# Patient Record
Sex: MALE | Race: BLACK OR AFRICAN AMERICAN | Employment: UNEMPLOYED | URBAN - METROPOLITAN AREA
[De-identification: names, ages, dates, MRNs, and addresses within clinical notes are randomized per-mention and may not be internally consistent; named-entity substitution may affect disease eponyms.]

---

## 2018-02-14 ENCOUNTER — HOSPITAL ENCOUNTER (EMERGENCY)
Age: 28
Discharge: PSYCHIATRIC OR SUBSTANCE USE FACILITY | End: 2018-02-14
Attending: EMERGENCY MEDICINE | Admitting: EMERGENCY MEDICINE
Payer: SELF-PAY

## 2018-02-14 ENCOUNTER — HOSPITAL ENCOUNTER (INPATIENT)
Age: 28
LOS: 2 days | Discharge: HOME | DRG: 885 | End: 2018-02-16
Attending: PSYCHIATRY & NEUROLOGY | Admitting: PSYCHIATRY & NEUROLOGY
Payer: COMMERCIAL

## 2018-02-14 VITALS
TEMPERATURE: 98.9 F | BODY MASS INDEX: 22.9 KG/M2 | HEIGHT: 70 IN | HEART RATE: 89 BPM | WEIGHT: 160 LBS | SYSTOLIC BLOOD PRESSURE: 109 MMHG | OXYGEN SATURATION: 100 % | DIASTOLIC BLOOD PRESSURE: 78 MMHG | RESPIRATION RATE: 18 BRPM

## 2018-02-14 DIAGNOSIS — R45.851 SUICIDAL IDEATION: ICD-10-CM

## 2018-02-14 DIAGNOSIS — F12.10 MARIJUANA ABUSE: ICD-10-CM

## 2018-02-14 DIAGNOSIS — F32.A DEPRESSION, UNSPECIFIED DEPRESSION TYPE: Primary | ICD-10-CM

## 2018-02-14 LAB
ALBUMIN SERPL-MCNC: 4.6 G/DL (ref 3.5–5)
ALBUMIN/GLOB SERPL: 1.4 {RATIO} (ref 1.1–2.2)
ALP SERPL-CCNC: 83 U/L (ref 45–117)
ALT SERPL-CCNC: 39 U/L (ref 12–78)
AMPHET UR QL SCN: NEGATIVE
ANION GAP SERPL CALC-SCNC: 13 MMOL/L (ref 5–15)
APPEARANCE UR: CLEAR
AST SERPL-CCNC: 28 U/L (ref 15–37)
BACTERIA URNS QL MICRO: NEGATIVE /HPF
BARBITURATES UR QL SCN: NEGATIVE
BASOPHILS # BLD: 0 K/UL (ref 0–0.1)
BASOPHILS NFR BLD: 0 % (ref 0–1)
BENZODIAZ UR QL: NEGATIVE
BILIRUB SERPL-MCNC: 0.6 MG/DL (ref 0.2–1)
BILIRUB UR QL: NEGATIVE
BUN SERPL-MCNC: 10 MG/DL (ref 6–20)
BUN/CREAT SERPL: 9 (ref 12–20)
CALCIUM SERPL-MCNC: 9.1 MG/DL (ref 8.5–10.1)
CANNABINOIDS UR QL SCN: POSITIVE
CHLORIDE SERPL-SCNC: 106 MMOL/L (ref 97–108)
CO2 SERPL-SCNC: 26 MMOL/L (ref 21–32)
COCAINE UR QL SCN: NEGATIVE
COLOR UR: ABNORMAL
CREAT SERPL-MCNC: 1.1 MG/DL (ref 0.7–1.3)
DIFFERENTIAL METHOD BLD: ABNORMAL
DRUG SCRN COMMENT,DRGCM: ABNORMAL
EOSINOPHIL # BLD: 0.1 K/UL (ref 0–0.4)
EOSINOPHIL NFR BLD: 1 % (ref 0–7)
EPITH CASTS URNS QL MICRO: ABNORMAL /LPF
ERYTHROCYTE [DISTWIDTH] IN BLOOD BY AUTOMATED COUNT: 11.9 % (ref 11.5–14.5)
ETHANOL SERPL-MCNC: 140 MG/DL
GLOBULIN SER CALC-MCNC: 3.3 G/DL (ref 2–4)
GLUCOSE SERPL-MCNC: 77 MG/DL (ref 65–100)
GLUCOSE UR STRIP.AUTO-MCNC: NEGATIVE MG/DL
HCT VFR BLD AUTO: 43.5 % (ref 36.6–50.3)
HGB BLD-MCNC: 15.2 G/DL (ref 12.1–17)
HGB UR QL STRIP: NEGATIVE
IMM GRANULOCYTES # BLD: 0 K/UL (ref 0–0.04)
IMM GRANULOCYTES NFR BLD AUTO: 0 % (ref 0–0.5)
KETONES UR QL STRIP.AUTO: NEGATIVE MG/DL
LEUKOCYTE ESTERASE UR QL STRIP.AUTO: NEGATIVE
LYMPHOCYTES # BLD: 3.1 K/UL (ref 0.8–3.5)
LYMPHOCYTES NFR BLD: 27 % (ref 12–49)
MCH RBC QN AUTO: 32.3 PG (ref 26–34)
MCHC RBC AUTO-ENTMCNC: 34.9 G/DL (ref 30–36.5)
MCV RBC AUTO: 92.4 FL (ref 80–99)
METHADONE UR QL: NEGATIVE
MONOCYTES # BLD: 0.8 K/UL (ref 0–1)
MONOCYTES NFR BLD: 7 % (ref 5–13)
MUCOUS THREADS URNS QL MICRO: ABNORMAL /LPF
NEUTS SEG # BLD: 7.4 K/UL (ref 1.8–8)
NEUTS SEG NFR BLD: 65 % (ref 32–75)
NITRITE UR QL STRIP.AUTO: NEGATIVE
NRBC # BLD: 0 K/UL (ref 0–0.01)
NRBC BLD-RTO: 0 PER 100 WBC
OPIATES UR QL: NEGATIVE
PCP UR QL: NEGATIVE
PH UR STRIP: 5.5 [PH] (ref 5–8)
PLATELET # BLD AUTO: 388 K/UL (ref 150–400)
PMV BLD AUTO: 8.9 FL (ref 8.9–12.9)
POTASSIUM SERPL-SCNC: 3.7 MMOL/L (ref 3.5–5.1)
PROT SERPL-MCNC: 7.9 G/DL (ref 6.4–8.2)
PROT UR STRIP-MCNC: NEGATIVE MG/DL
RBC # BLD AUTO: 4.71 M/UL (ref 4.1–5.7)
RBC #/AREA URNS HPF: ABNORMAL /HPF (ref 0–5)
SODIUM SERPL-SCNC: 145 MMOL/L (ref 136–145)
SP GR UR REFRACTOMETRY: 1.02 (ref 1–1.03)
UA: UC IF INDICATED,UAUC: ABNORMAL
UROBILINOGEN UR QL STRIP.AUTO: 0.2 EU/DL (ref 0.2–1)
WBC # BLD AUTO: 11.4 K/UL (ref 4.1–11.1)
WBC URNS QL MICRO: ABNORMAL /HPF (ref 0–4)

## 2018-02-14 PROCEDURE — 80053 COMPREHEN METABOLIC PANEL: CPT | Performed by: EMERGENCY MEDICINE

## 2018-02-14 PROCEDURE — 65220000003 HC RM SEMIPRIVATE PSYCH

## 2018-02-14 PROCEDURE — 36415 COLL VENOUS BLD VENIPUNCTURE: CPT | Performed by: EMERGENCY MEDICINE

## 2018-02-14 PROCEDURE — 80307 DRUG TEST PRSMV CHEM ANLYZR: CPT | Performed by: EMERGENCY MEDICINE

## 2018-02-14 PROCEDURE — 81001 URINALYSIS AUTO W/SCOPE: CPT | Performed by: EMERGENCY MEDICINE

## 2018-02-14 PROCEDURE — 99285 EMERGENCY DEPT VISIT HI MDM: CPT

## 2018-02-14 PROCEDURE — 85025 COMPLETE CBC W/AUTO DIFF WBC: CPT | Performed by: EMERGENCY MEDICINE

## 2018-02-14 RX ORDER — LORAZEPAM 2 MG/ML
2 INJECTION INTRAMUSCULAR
Status: DISCONTINUED | OUTPATIENT
Start: 2018-02-14 | End: 2018-02-16 | Stop reason: HOSPADM

## 2018-02-14 RX ORDER — OLANZAPINE 5 MG/1
5 TABLET ORAL
Status: DISCONTINUED | OUTPATIENT
Start: 2018-02-14 | End: 2018-02-16 | Stop reason: HOSPADM

## 2018-02-14 RX ORDER — ACETAMINOPHEN 325 MG/1
650 TABLET ORAL
Status: DISCONTINUED | OUTPATIENT
Start: 2018-02-14 | End: 2018-02-16 | Stop reason: HOSPADM

## 2018-02-14 RX ORDER — ZOLPIDEM TARTRATE 10 MG/1
10 TABLET ORAL
Status: DISCONTINUED | OUTPATIENT
Start: 2018-02-14 | End: 2018-02-16 | Stop reason: HOSPADM

## 2018-02-14 RX ORDER — BENZTROPINE MESYLATE 2 MG/1
2 TABLET ORAL
Status: DISCONTINUED | OUTPATIENT
Start: 2018-02-14 | End: 2018-02-16 | Stop reason: HOSPADM

## 2018-02-14 RX ORDER — ADHESIVE BANDAGE
30 BANDAGE TOPICAL DAILY PRN
Status: DISCONTINUED | OUTPATIENT
Start: 2018-02-14 | End: 2018-02-16 | Stop reason: HOSPADM

## 2018-02-14 RX ORDER — BENZTROPINE MESYLATE 1 MG/ML
2 INJECTION INTRAMUSCULAR; INTRAVENOUS
Status: DISCONTINUED | OUTPATIENT
Start: 2018-02-14 | End: 2018-02-16 | Stop reason: HOSPADM

## 2018-02-14 RX ORDER — IBUPROFEN 200 MG
1 TABLET ORAL
Status: DISCONTINUED | OUTPATIENT
Start: 2018-02-14 | End: 2018-02-16 | Stop reason: HOSPADM

## 2018-02-14 RX ORDER — LORAZEPAM 1 MG/1
1 TABLET ORAL
Status: DISCONTINUED | OUTPATIENT
Start: 2018-02-14 | End: 2018-02-16 | Stop reason: HOSPADM

## 2018-02-14 RX ORDER — IBUPROFEN 400 MG/1
400 TABLET ORAL
Status: DISCONTINUED | OUTPATIENT
Start: 2018-02-14 | End: 2018-02-16 | Stop reason: HOSPADM

## 2018-02-14 NOTE — ED NOTES
[id number] Lucie Mead for pt to eat/drink per dr nabor md. Diet tray ordered and given to pt. Pt given water/crackers. No si/s of acute distress. [employer] at bedside.

## 2018-02-14 NOTE — IP AVS SNAPSHOT
Summary of Care Report The Summary of Care report has been created to help improve care coordination. Users with access to [employer] or [address] (Web-based application) may access additional patient information including the Discharge Summary. If you are not currently a [address] user and need more information, please call the number listed below in the [place] [address] section and ask to be connected with Medical Records. Facility Information Name Address Phone [address] [id number] [phone] Patient Information Patient Name Sex ABDOULAYE Gordon ([id number]) Male 1990 Discharge Information Admitting Provider Service Area Unit Celestine West MD / [id number] [facility] / [phone] Discharge Provider Discharge Date/Time Discharge Disposition Destination (none) 2018 Afternoon (Pending) AHR (none) Patient Language Language ENGLISH [13] Hospital Problems as of 2018  Never Reviewed Class Noted - Resolved Last Modified POA Active Problems * (Principal)Severe episode of recurrent major depressive disorder ([facility] [address]  2/15/2018 - Present 2/15/2018 by Zachary Lozano MD Yes Entered by Zachary Lozano MD  
  Alcohol abuse  2/15/2018 - Present 2/15/2018 by Zachary Lozano MD Yes Entered by Zachary Lozano MD  
  
You are allergic to the following No active allergies Current Discharge Medication List  
  
START taking these medications Dose & Instructions Dispensing Information Comments FLUoxetine 10 mg capsule Commonly known as:  PROzac Start taking on:  2018 Dose:  10 mg Take 1 Cap by mouth daily. Indications: Generalized Anxiety Disorder, major depressive disorder Quantity:  15 Cap Refills:  1 STOP taking these medications Comments Ascorbic Acid-Multivits-Min 1,000 mg Pwep Follow-up Information Follow up With Details Comments Contact Info None   None (395) Patient stated that they have no PCP Daily [address] [place] [address] On 2018 Time of Your Appt: 8:00 am [address] [address] Discharge Instructions DISCHARGE SUMMARY 
 
Angeline Calloway : 1990 MRN: [id number] The patient Vera Simmonds exhibits the ability to control behavior in a less restrictive environment. Patient's level of functioning is improving. No assaultive/destructive behavior has been observed for the past 24 hours. No suicidal/homicidal threat or behavior has been observed for the past 24 hours. There is no evidence of serious medication side effects. Patient has not been in physical or protective restraints for at least the past 24 hours. If weapons involved, how are they secured? Yes, follow up with  [address] Is patient aware of and in agreement with discharge plan? Yes,  follow up with  [address] Arrangements for medication:  Prescriptions given to patient. Referral for substance abuse treatment ? No 
 
Referral for smoking cessation needed ? No 
 
Copy of discharge instructions to  provider?:  [phone] Arrangements for transportation home:  Pt to arrange transportation Keep all follow up appointments as scheduled, continue to take prescribed medications per physician instructions. Mental health crisis number:  [phone] or your local mental health crisis line number at [phone] Chart Review Routing History Recipient Method Report Sent By Anthony Smith MD  
Fax: [phone] Phone: [phone] Fax Mike Sheets Routed Luan Johnson MD [[id number]] 2018  6:41 AM 2018

## 2018-02-14 NOTE — ED NOTES

## 2018-02-14 NOTE — ED PROVIDER NOTES
EMERGENCY DEPARTMENT HISTORY AND PHYSICAL EXAM      Date: 2/14/2018  Patient Name: Erwin Song    History of Presenting Illness     Chief Complaint   Patient presents with   [address] Problem       History Provided By: Patient    HPI: Erwin Song, 32 y.o. male presents ambulatory to the ED in [facility]. Pt reports constant, mild to moderate depression that has worsened in the past few days. He notes associated SI. He states he called his mother today expressing suicidal thoughts so she called the police to bring him in to the ED today for evaluation. He notes this time of year exacerbates his depression because around this time 2 years ago his brother killed himself. He states he has never taken medications for his depression and does not want to start taking them but notes he is open to talking to someone about it. He states he lives at home alone. He reports occasional alcohol use, stating he drinks at home alone a few times a week. He notes he works as a  at the [employer] and had 1/3 of a bottle of vodka last night. He reports tobacco and occasional marijuana use. Pt specifically denies any NVD or fever. PCP: None    There are no other complaints, changes, or physical findings at this time. Past History     Past Medical History:  History reviewed. No pertinent past medical history. Past Surgical History:  History reviewed. No pertinent surgical history. Family History:  History reviewed. No pertinent family history. Social History:  Social History   Substance Use Topics    Smoking status: Never Smoker    Smokeless tobacco: Never Used    Alcohol use Yes      Comment: \"beer,\" admitted to drinking earlier today, smells of ETOH       Allergies:  No Known Allergies      Review of Systems   Review of Systems   Constitutional: Negative for chills and fever. HENT: Negative for congestion, rhinorrhea, sneezing and sore throat. Eyes: Negative for redness and visual disturbance. Respiratory: Negative for shortness of breath. Cardiovascular: Negative for chest pain and leg swelling. Gastrointestinal: Negative for abdominal pain, nausea and vomiting. Genitourinary: Negative for difficulty urinating and frequency. Musculoskeletal: Negative for back pain, myalgias and neck stiffness. Skin: Negative for rash. Neurological: Negative for dizziness, syncope, weakness and headaches. Hematological: Negative for adenopathy. Psychiatric/Behavioral: Positive for suicidal ideas. (+) depression   All other systems reviewed and are negative. Physical Exam   Physical Exam   Constitutional: He is oriented to person, place, and time. He appears well-developed and well-nourished. HENT:   Head: Normocephalic and atraumatic. Mouth/Throat: Oropharynx is clear and moist and mucous membranes are normal.   Eyes: EOM are normal.   Neck: Normal range of motion and full passive range of motion without pain. Neck supple. Cardiovascular: Regular rhythm, normal heart sounds, intact distal pulses and normal pulses. Tachycardia present. No murmur heard. Pulmonary/Chest: Effort normal and breath sounds normal. No respiratory distress. He exhibits no tenderness. Abdominal: Soft. Normal appearance and bowel sounds are normal. There is no tenderness. There is no rebound and no guarding. Neurological: He is alert and oriented to person, place, and time. He has normal strength. Skin: Skin is warm, dry and intact. No rash noted. No erythema. Psychiatric: His speech is normal and behavior is normal. Judgment and thought content normal. He exhibits a depressed mood. Flat affect. Avoids eye contact. Eyes are blood shot. Tearful. In handcuffs. Nursing note and vitals reviewed.     Diagnostic Study Results     Labs -     Recent Results (from the past 12 hour(s))   ETHYL ALCOHOL    Collection Time: 02/14/18  5:19 PM   Result Value Ref Range    ALCOHOL(ETHYL),SERUM 140 (H) <10 MG/DL CBC WITH AUTOMATED DIFF    Collection Time: 02/14/18  5:19 PM   Result Value Ref Range    WBC 11.4 (H) 4.1 - 11.1 K/uL    RBC 4.71 4.10 - 5.70 M/uL    HGB 15.2 12.1 - 17.0 g/dL    HCT 43.5 36.6 - 50.3 %    MCV 92.4 80.0 - 99.0 FL    MCH 32.3 26.0 - 34.0 PG    MCHC 34.9 30.0 - 36.5 g/dL    RDW 11.9 11.5 - 14.5 %    PLATELET 186 217 - 755 K/uL    MPV 8.9 8.9 - 12.9 FL    NRBC 0.0 0  WBC    ABSOLUTE NRBC 0.00 0.00 - 0.01 K/uL    NEUTROPHILS 65 32 - 75 %    LYMPHOCYTES 27 12 - 49 %    MONOCYTES 7 5 - 13 %    EOSINOPHILS 1 0 - 7 %    BASOPHILS 0 0 - 1 %    IMMATURE GRANULOCYTES 0 0.0 - 0.5 %    ABS. NEUTROPHILS 7.4 1.8 - 8.0 K/UL    ABS. LYMPHOCYTES 3.1 0.8 - 3.5 K/UL    ABS. MONOCYTES 0.8 0.0 - 1.0 K/UL    ABS. EOSINOPHILS 0.1 0.0 - 0.4 K/UL    ABS. BASOPHILS 0.0 0.0 - 0.1 K/UL    ABS. IMM. GRANS. 0.0 0.00 - 0.04 K/UL    DF AUTOMATED     METABOLIC PANEL, COMPREHENSIVE    Collection Time: 02/14/18  5:19 PM   Result Value Ref Range    Sodium 145 136 - 145 mmol/L    Potassium 3.7 3.5 - 5.1 mmol/L    Chloride 106 97 - 108 mmol/L    CO2 26 21 - 32 mmol/L    Anion gap 13 5 - 15 mmol/L    Glucose 77 65 - 100 mg/dL    BUN 10 6 - 20 MG/DL    Creatinine 1.10 0.70 - 1.30 MG/DL    BUN/Creatinine ratio 9 (L) 12 - 20      GFR est AA >60 >60 ml/min/1.73m2    GFR est non-AA >60 >60 ml/min/1.73m2    Calcium 9.1 8.5 - 10.1 MG/DL    Bilirubin, total 0.6 0.2 - 1.0 MG/DL    ALT (SGPT) 39 12 - 78 U/L    AST (SGOT) 28 15 - 37 U/L    Alk.  phosphatase 83 45 - 117 U/L    Protein, total 7.9 6.4 - 8.2 g/dL    Albumin 4.6 3.5 - 5.0 g/dL    Globulin 3.3 2.0 - 4.0 g/dL    A-G Ratio 1.4 1.1 - 2.2     DRUG SCREEN, URINE    Collection Time: 02/14/18  5:19 PM   Result Value Ref Range    AMPHETAMINES NEGATIVE  NEG      BARBITURATES NEGATIVE  NEG      BENZODIAZEPINES NEGATIVE  NEG      COCAINE NEGATIVE  NEG      METHADONE NEGATIVE  NEG      OPIATES NEGATIVE  NEG      PCP(PHENCYCLIDINE) NEGATIVE  NEG      THC (TH-CANNABINOL) POSITIVE (A) NEG Drug screen comment (NOTE)    URINALYSIS W/ REFLEX CULTURE    Collection Time: 02/14/18  5:19 PM   Result Value Ref Range    Color YELLOW/STRAW      Appearance CLEAR CLEAR      Specific gravity 1.025 1.003 - 1.030      pH (UA) 5.5 5.0 - 8.0      Protein NEGATIVE  NEG mg/dL    Glucose NEGATIVE  NEG mg/dL    Ketone NEGATIVE  NEG mg/dL    Bilirubin NEGATIVE  NEG      Blood NEGATIVE  NEG      Urobilinogen 0.2 0.2 - 1.0 EU/dL    Nitrites NEGATIVE  NEG      Leukocyte Esterase NEGATIVE  NEG      WBC 0-4 0 - 4 /hpf    RBC 0-5 0 - 5 /hpf    Epithelial cells FEW FEW /lpf    Bacteria NEGATIVE  NEG /hpf    UA:UC IF INDICATED CULTURE NOT INDICATED BY UA RESULT CNI      Mucus 1+ (A) NEG /lpf       Medical Decision Making   I am the first provider for this patient. I reviewed the vital signs, available nursing notes, past medical history, past surgical history, family history and social history. Vital Signs-Reviewed the patient's vital signs. Patient Vitals for the past 12 hrs:   Temp Pulse Resp BP SpO2   02/14/18 1625 - (!) 110 - - -   02/14/18 1622 97.3 °F (36.3 °C) (!) 123 20 (!) 145/96 97 %     Pulse Oximetry Analysis - 98% on RA    Cardiac Monitor:   Rate: 110 bpm    Records Reviewed: Nursing Notes and Old Medical Records    Provider Notes (Medical Decision Making):   DDx: Depression, SI, medical clearance for TDO. ED Course:   Initial assessment performed. The patients presenting problems have been discussed, and they are in agreement with the care plan formulated and outlined with them. I have encouraged them to ask questions as they arise throughout their visit. 6:43 PM  Pt has been medically cleared. Written by Brandee Denny, ED Scribe, as dictated by Yusra Reilly MD.    Disposition:    TRANSFER NOTE:  9:43 PM  Patient is being transferred to [facility], transfer accepted by Dr. Srinivas Golden.  The reasons for patient's transfer have been discussed with the patient and available family. They convey agreement and understanding for the need to be transferred as explained to them by [employer], DO. PLAN:  1. Transfer to [facility]    Diagnosis     Clinical Impression:   1. Depression, unspecified depression type    2. Suicidal ideation    3. Marijuana abuse        Attestations: This note is prepared by Angella Gonzalez, acting as Scribe for Roxy Andrews MD.    Roxy Andrews MD: The scribe's documentation has been prepared under my direction and personally reviewed by me in its entirety. I confirm that the note above accurately reflects all work, treatment, procedures, and medical decision making performed by me.

## 2018-02-14 NOTE — ED TRIAGE NOTES
WILLIAMD brought pt into ED after pt c/o \"i want to just run out in a field and shoot myself. \" pt admitted the same on arrival to ED. Pt crying and laughing on/off. Denied previous psych admissions/history. Denied HI.

## 2018-02-14 NOTE — IP AVS SNAPSHOT
[address] 
[phone] Patient: Gaetano Bullock MRN: [id number] PBE:3/59/7704 About your hospitalization You were admitted on:  2018 You last received care in the:  [facility] You were discharged on:  2018 Why you were hospitalized Your primary diagnosis was:  Severe Episode Of Recurrent Major Depressive Disorder (Hcc) Your diagnoses also included:  Alcohol Abuse Follow-up Information Follow up With Details Comments Contact Info None   None (395) Patient stated that they have no PCP Daily [address] [place] [address] On 2018 Time of Your Appt: 8:00 am [address] [address] Discharge Orders None A check cruz indicates which time of day the medication should be taken. My Medications START taking these medications Instructions Each Dose to Equal  
 Morning Noon Evening Bedtime FLUoxetine 10 mg capsule Commonly known as:  PROzac Start taking on:  2018 Your last dose was: Your next dose is: Take 1 Cap by mouth daily. Indications: Generalized Anxiety Disorder, major depressive disorder 10 mg  
    
   
   
   
  
  
STOP taking these medications Ascorbic Acid-Multivits-Min 1,000 mg Pwep Where to Get Your Medications Information on where to get these meds will be given to you by the nurse or doctor. ! Ask your nurse or doctor about these medications FLUoxetine 10 mg capsule Discharge Instructions DISCHARGE SUMMARY 
 
Amol Almaraz : 1990 MRN: [id number] The patient Gaetano Bullock exhibits the ability to control behavior in a less restrictive environment. Patient's level of functioning is improving.   No assaultive/destructive behavior has been observed for the past 24 hours. No suicidal/homicidal threat or behavior has been observed for the past 24 hours. There is no evidence of serious medication side effects. Patient has not been in physical or protective restraints for at least the past 24 hours. If weapons involved, how are they secured? Yes, follow up with [address] Is patient aware of and in agreement with discharge plan? Yes,  follow up with [address] Arrangements for medication:  Prescriptions given to patient. Referral for substance abuse treatment ? No 
 
Referral for smoking cessation needed ? No 
 
Copy of discharge instructions to  provider?:  [phone] Arrangements for transportation home:  Pt to arrange transportation Keep all follow up appointments as scheduled, continue to take prescribed medications per physician instructions. Mental health crisis number:  101 or your local mental health crisis line number at [phone] Introducing [facility] SERVICES! Arcelia Phipps introduces [employer] patient portal. Now you can access parts of your medical record, email your doctor's office, and request medication refills online. 1. In your internet browser, go to [email or web address] 2. Click on the First Time User? Click Here link in the Sign In box. You will see the New Member Sign Up page. 3. Enter your [employer] Access Code exactly as it appears below. You will not need to use this code after youve completed the sign-up process. If you do not sign up before the expiration date, you must request a new code. · [employer] Access Code: [email or web address] Expires: 5/17/2018  5:33 PM 
 
4. Enter the last four digits of your Social Security Number (xxxx) and Date of Birth (mm/dd/yyyy) as indicated and click Submit. You will be taken to the next sign-up page. 5. Create a [employer] ID.  This will be your [employer] login ID and cannot be changed, so think of one that is secure and easy to remember. 6. Create a AddSearch password. You can change your password at any time. 7. Enter your Password Reset Question and Answer. This can be used at a later time if you forget your password. 8. Enter your e-mail address. You will receive e-mail notification when new information is available in [address] 9. Click Sign Up. You can now view and download portions of your medical record. 10. Click the Download Summary menu link to download a portable copy of your medical information. If you have questions, please visit the Frequently Asked Questions section of the AddSearch website. Remember, AddSearch is NOT to be used for urgent needs. For medical emergencies, dial 911. Now available from your iPhone and Android! Providers Seen During Your Hospitalization Provider Specialty Primary office phone Carlos Chisholm MD Psychiatry [phone] Dwight Morlaes MD Psychiatry [phone] Your Primary Care Physician (PCP) Primary Care Physician Office Phone Office Fax NONE ** None ** ** None ** You are allergic to the following No active allergies Recent Documentation Height Weight BMI Smoking Status 1.778 m 72.6 kg 22.96 kg/m2 Never Smoker Emergency Contacts Name Discharge Info Relation Home Work Mobile Maria Fernanda Saravia DISCHARGE CAREGIVER [3] Mother [14] [phone] Patient Belongings The following personal items are in your possession at time of discharge: 
  Dental Appliances: None  Visual Aid: None      Home Medications: None   Jewelry: None  Clothing: Bathrobe, Jacket/Coat, Pants, Shirt, Slippers, Socks, Undergarments (sweat pants w/ string, green jacket in Pt locker)    Other Valuables: None  Personal Items Sent to Safe: none Please provide this summary of care documentation to your next provider. Signatures-by signing, you are acknowledging that this After Visit Summary has been reviewed with you and you have received a copy. Patient Signature:  ____________________________________________________________ Date:  ____________________________________________________________  
  
Hope Phenes Provider Signature:  ____________________________________________________________ Date:  ____________________________________________________________

## 2018-02-15 PROBLEM — F10.10 ALCOHOL ABUSE: Status: ACTIVE | Noted: 2018-02-15

## 2018-02-15 PROBLEM — F33.2 SEVERE EPISODE OF RECURRENT MAJOR DEPRESSIVE DISORDER (HCC): Status: ACTIVE | Noted: 2018-02-15

## 2018-02-15 PROBLEM — F32.A DEPRESSION: Status: ACTIVE | Noted: 2018-02-15

## 2018-02-15 PROCEDURE — 65220000003 HC RM SEMIPRIVATE PSYCH

## 2018-02-15 PROCEDURE — 74011250637 HC RX REV CODE- 250/637: Performed by: PSYCHIATRY & NEUROLOGY

## 2018-02-15 RX ORDER — FLUOXETINE 10 MG/1
10 CAPSULE ORAL DAILY
Status: DISCONTINUED | OUTPATIENT
Start: 2018-02-15 | End: 2018-02-16 | Stop reason: HOSPADM

## 2018-02-15 RX ORDER — FLUOXETINE 10 MG/1
10 CAPSULE ORAL DAILY
Status: DISCONTINUED | OUTPATIENT
Start: 2018-02-16 | End: 2018-02-15

## 2018-02-15 RX ADMIN — LORAZEPAM 1 MG: 1 TABLET ORAL at 06:54

## 2018-02-15 RX ADMIN — FLUOXETINE 10 MG: 10 CAPSULE ORAL at 14:11

## 2018-02-15 RX ADMIN — ACETAMINOPHEN 650 MG: 325 TABLET, FILM COATED ORAL at 00:00

## 2018-02-15 NOTE — CONSULTS
[address]    Roseline Easton.  MR#: [id number]  : 1990  ACCOUNT #: [de-identified]   DATE OF SERVICE: 02/15/2018    PRIMARY CARE PHYSICIAN:  None. DOCTOR REQUESTING THE MEDICAL CONSULT:  Dr. Edgar Galeana. REASON FOR MEDICAL CONSULT:  Routine history and physical required for admission into the psychiatric unit. SOURCE OF INFORMATION:  The patient. CHIEF COMPLAINT:  None. HISTORY OF PRESENT ILLNESS:  This is a 75-year-old man without any significant past medical history who was admitted to the psychiatric unit with a temporary retirement order (TDO) for evaluation and treatment of depression. Medical consult was requested by the psychiatry service for routine history and physical required for admission into the psychiatric unit. The patient has no specific medical complaints. Denies headache, chest pain, blurring of vision, no fever, no rigors, no chills. PAST MEDICAL HISTORY:  Not significant. PAST SURGICAL HISTORY:  Not known. ALLERGIES:  NO KNOWN DRUG ALLERGIES. MEDICATIONS:  Patient is not on any medication at home. FAMILY HISTORY:  This was reviewed, is not pertinent to present illness. No family history of diabetes or hypertension. PAST SURGICAL HISTORY:  Not significant. SOCIAL HISTORY:  Patient smokes a few cigarettes daily. Admits to alcohol consumption. The patient had a couple of drinks before the TDO for depression. REVIEW OF SYSTEMS:  HEENT:  No headache, no dizziness, no blurring of vision, no photophobia. RESPIRATORY:  No cough, no shortness of breath, no hemoptysis. CARDIOVASCULAR:  No chest pain, orthopnea. No palpitations. GASTROINTESTINAL:  No nausea, vomiting, no diarrhea, no constipation. GENITOURINARY:  No dysuria, no urgency and no frequency. All other systems are reviewed and they are negative. PHYSICAL EXAMINATION:  GENERAL:  Patient appeared stable, in no acute distress.   VITAL SIGNS: Temperature 98.6, pulse 71, blood pressure 127/84, respiratory rate 18, oxygen saturation of 98%. Head:  Normocephalic, atraumatic. EYES:  Normal eye movements. No redness, no drainage, no discharge. EARS:  Normal external ears with no obvious drainage. NOSE:  No deformity and no drainage. MOUTH AND THROAT:  No visible oral lesion. NECK:  Supple. No JVD, no thyromegaly. CHEST:  Clear breath sounds: No wheezing, no crackles. HEART:  Normal S1, S2, regular. No clinically appreciable murmur. ABDOMEN:  Soft, nontender, normal bowel sounds. CENTRAL NERVOUS SYSTEM:  Alert, oriented x3. No gross focal neurological deficit. EXTREMITIES:  No edema. Pulses 2+ bilaterally. MUSCULOSKELETAL:  No obvious joint deformity or swelling. SKIN:  No active skin lesion seen in the exposed part of the body. PSYCHIATRY:  Unable to assess mood and affect. LYMPHATIC SYSTEM:  No cervical lymphadenopathy. DIAGNOSTIC DATA:  None. LABORATORY DATA:  Hematology:  WBC is 11.4, hemoglobin 15.3, hematocrit 43.5, platelet 617. Urinalysis is significant for negative nitrites, negative leukocyte esterase, negative bacteria. Chemistry:  Sodium 145, potassium 3.7, chloride 106, CO2 26, glucose 77, BUN 10, creatinine 1.10, calcium 9.1, bilirubin total 0.6, total protein 7.9, albumin level 4.6, globulin 3.3, ALT 39, AST 28, alkaline phosphatase 83. Urine drug screen: This is positive for cannabinol, serum alcohol level 140. ASSESSMENT:  1. Depression. 2.  Tobacco abuse. 3.  Alcohol abuse. 4.  Marijuana use. PLAN:  1. Depression. Patient will continue to receive evaluation and treatment for depression as per psychiatric service. This is the main reason why the patient was admitted to the psychiatric unit. 2.  Tobacco abuse. Patient has been placed on a Nicoderm patch. Patient advised to quit smoking. 3.  Alcohol abuse.   Patient is being treated by the psychiatric service for the alcohol abuse including potential alcohol withdrawal.  4.  Marijuana use. The patient will receive treatment for substance abuse as well by the psychiatric service. SUMMARY:  This is a 66-year-old man without any significant past medical history who was admitted to the psychiatry care unit for evaluation and treatment of depression. Medical consult was requested for routine history and physical required for admission into the psychiatric unit, the management of the patient's medical problems are as listed above. Thank you for involving the hospitalist service in the care of this patient. We will sign off. Please call back the hospitalist service if there are any acute medical problems. Less than 30 minutes spent on this medical consult.       Mónica Calix MD       RE / JOSE ARMANDO  D: 02/15/2018 03:58     T: 02/15/2018 08:22  JOB #: [id number]  CC: Errol Soriano MD

## 2018-02-15 NOTE — BH NOTES
Admission Note:     This 32year old BM was admitted via TDO status from [facility] ED to [facility]. Diagnosis : Depression      Reason for Admission   Suicidal IdeationsWas brought to ED by [facility] after c/o SI to mother and Prescreening Evaluator. Stated, \"I want to just run out in a field and shoot myself. \"      Precipitating Factors:  Stressors related to death of friend who committed suicide 2 years ago during this time of year and Live-in girlfriend recently moving back home with her parents (had lived together for 8 years)    History Of: No history of Physical/mental illnesses per pt                      No Previous Psych Hospitalizations                      No Previous  Detox Hospitalizations    Substance Abuse History:     Alcohol - 6 beers daily since age 13; liquor (Unspecified amount , \"When available. \"  BAL - 140  THC - \"1 Joint per day\"    UDS + THC     Legal Issues:  Charges- DUI . Has court dated for 2/15/17 ([address])    Unit Presentation:  Cooperative with admission process. Depressed mood; tearful at times. Currently denies suicidal ideation. Currently denies homicidal ideation. Patient verbally contracts for safety.      Was introduced to staff, oriented to unit, given unit handbook and educated on its contents.                            Aditya Score = 23; Mews = 1 CIWA = 7    Dr. Alfaro Hand notified of need for H & P.

## 2018-02-15 NOTE — BH NOTES
PRN Medication Documentation - 3/10 pain   Specific patient behavior that led to need for PRN medication: c/o of headache   Staff interventions attempted prior to PRN being given: given dinner and po fluids   PRN medication given: tylenol 650 mg po   Patient response/effectiveness of PRN medication: sleeping     0300- Dr Richard Kaba in to see pt for H&P

## 2018-02-15 NOTE — PROGRESS NOTES
Problem: Suicide/Homicide (Adult/Pediatric)2/19/18  Goal: *STG: Remains safe in hospital  Outcome: Progressing Towards Goal  Pt denies any suicidal or homicidal thoughts. Contracts for safety. Remains on q 15 min safety checks. Mood appears depressed and affect is flat. Thought content is logical.Information given to [employer] related to court date today that he was in hospital and unable to attend. Goal: *STG: Attends activities and groups  Outcome: Not Progressing Towards Goal  Has been isolative except for meals. Declined group attendance at this time. Problem: Falls - Risk of  Goal: *Absence of Falls  Document Janell Fall Risk and appropriate interventions in the flowsheet. Outcome: Progressing Towards Goal  Fall Risk Interventions:Gait is steady . No falls reported. Wearing slip resistant footwear.             Medication Interventions: Teach patient to arise slowly              New 72        Date Treatment Plan Initiated: 2/15/2018      Treatment Plan Modalities:    Type of Modality Amount  (x minutes) Frequency (x/week) Duration (x days) Name of Responsible Staff   Community & wrap-up meetings to encourage peer interactions    15    7    1     TOO Bates   Group psychotherapy to assist in building coping skills and internal controls    60    7    1    Chris Huerta LCSW   Therapeutic activity groups to build coping skills    60    7    1    Chris Huerta LCSW   Psychoeducation in group setting to address:   Medication education    15    7    1    Keysha Tierney RN   Coping skills    20    7    1    Gini Gao RN   Relaxation techniques           Symptom management           Discharge planning    15    7    1    COREY Alcaraz    Spirituality     60    7    1    Chaplain Michael DILLARD    60    7    1    Volunteer from [email or web address]   [facility]/AA/NA    60    7    1    Volunteer from [address] [place] [address] medication management    15    7    1    Dr. Deepthi Torres meeting/discharge planning

## 2018-02-15 NOTE — PROGRESS NOTES
Problem: Depressed Mood (Adult/Pediatric)  Goal: *STG: Remains safe in hospital  Outcome: Progressing Towards Goal  Pt is new admission to unit. Tearful, yet verbally contracts for safety.  Q 15 minute checks implemented to assure pt remains safe while in hospital.

## 2018-02-15 NOTE — INTERDISCIPLINARY ROUNDS
Behavioral Health Interdisciplinary Rounds     Patient Name: Mulugeta Hall  Age: 32 y.o. Room/Bed:  734/02  Primary Diagnosis: <principal problem not specified>   Admission Status: TDO     Readmission within 30 days: yes  Power of  in place: no  Patient requires a blocked bed: no          Reason for blocked bed: n/a    VTE Prophylaxis: Not indicated  Flu vaccine given : no - Pt declined  Mobility needs/Fall risk: no    Nutritional Plan: no  Consults: H & P         Labs/Testing due today?: no    Sleep hours: 5.5       Participation in Care/Groups:  No - New Admission  Medication Compliant?: No scheduled Meds ordered.  New Admission  PRNS (last 24 hours): Pain    Restraints (last 24 hours):  no  Substance Abuse:  yes  CIWA (range last 24 hours):0- 7 COWS (range last 24 hours):   Alcohol screening (AUDIT) completed -  AUDIT Score: 14  If applicable, date SBIRT discussed in treatment team AND documented:   Tobacco - patient is a smoker: yes   Date tobacco education completed by RN: 2/14/18  24 hour chart check complete: no - New Admission    Patient goal(s) for today:   Treatment team focus/goals: Patient becoming oriented to unit rules and activities  Progress note     LOS:  1  Expected LOS: TBD    Financial concerns/prescription coverage:  No insurance  Date of last family contact:       Family requesting physician contact today:    Discharge plan:   Guns in the home: no        Outpatient provider(s):     Participating treatment team members: Dr. Talha Pena; Tiesha Damico; Mila Medina

## 2018-02-15 NOTE — PROGRESS NOTES
02/15/18 0654   Vital Signs   Temp 98.7 °F (37.1 °C)   Temp Source Oral   Pulse (Heart Rate) 76   Heart Rate Source Monitor   Resp Rate 18   O2 Sat (%) 100 %   Level of Consciousness Alert   BP (!) 141/102   MAP (Calculated) 115   BP 1 Method Automatic   BP 1 Location Right arm   BP Patient Position Sitting   MEWS Score 1   Pain 1   Pain Scale 1 Numeric (0 - 10)   Pain Intensity 1 0   Patient Stated Pain Goal 0     Pt c/o anxiety. CIWA = 7. Ativan 1 mg po administered by med Nurse for c/o anxiety.

## 2018-02-15 NOTE — BH NOTES
TRANSFER - IN REPORT:    Verbal report received from [place] (RN) on [facility]  being received from [facility] for routine progression of care      Report consisted of patients Situation, Background, Assessment and   Recommendations(SBAR). Information from the following report(s) SBAR was reviewed with the receiving nurse. Opportunity for questions and clarification was provided. Assessment completed upon patients arrival to unit and care assumed.

## 2018-02-15 NOTE — BH NOTES
PSYCHOSOCIAL ASSESSMENT    Patient identifying info:  Ольга Parnell is a 32 y.o., male admitted 2/14/2018 11:08 PM     Presenting problem and precipitating factors:  Patient brought to the ED by police after he made a statement that \"I want to just run out in a field and shoot myself. \" TDO issued. Mental status assessment:   Patient is depressed and tearful at times. Current psychiatric/substance abuse providers and contact info: None    Previous psychiatric or substance abuse services/providers and response to treatment:  None     Family history of substance abuse or mental illness: Patient has only met his father twice in his life. He states everyone in his mother's family is depressed. Patient drinks and smokes marijuana. Substance abuse history: Smokes marijuana and drinks alcohol  Social History   Substance Use Topics    Smoking status: Never Smoker    Smokeless tobacco: Never Used    Alcohol use Yes      Comment: \"beer,\" admitted to drinking earlier today, smells of ETOH       History of biomedical complications associated with substance abuse:      Patient's current acceptance of treatment or motivation for change: Not voicing motivation to change at this point. Family constellation:Patient is an only child. Does not know his biological father. Is significant other involved? Girlfriend    Describe support system: Patient's mother Guillermo Calvo [phone] is extremely supportive    Describe living arrangements and home environment: Patient lives alone in his grandmother's house in the [address] [place] [address]. Health issues:   Hospital Problems  Never Reviewed          Codes Class Noted POA    Depression ICD-10-CM: F32.9  ICD-9-CM: 274  2/15/2018 Unknown              Trauma history: Patient Denies    Legal issues: Patient had a court date for today. He was charged with being drunk in public. Court was contacted [phone] about this admission and  was informed, per  Hu[facility] Notice.     History of  service: No    Financial status:     Episcopal/cultural factors:     Education/work history:  Patient works as a     Have you been licensed as a health care professional ( current or  ) : No    Leisure and recreation preferences:    Describe coping skills:Limited and ineffectual  Tila Hensley  2/15/2018

## 2018-02-15 NOTE — BH NOTES
GROUP THERAPY PROGRESS NOTE    Erin Blanc did not participate in an Afternoon Process Group on the Acute Unit with a focus on identifying feelings, planning for the day, and learning more about Self-nurturance.

## 2018-02-15 NOTE — ED NOTES
Report called to [facility] for transfer of care. [address] [place] [address] here with TDO in hand for patient transfer. EMTALA paperwork completed. Pt ambulated to transport vehicle by [address] [place] [address] for transfer of care.

## 2018-02-15 NOTE — BH NOTES
Rheba Krabbe (RN) and  Danna Soriano (JESSICA) performed a dual skin assessment on this patient. No impairment noted. Pressure Ulcer Documentation   (COMPLETE ONE LABEL PER PRESSURE ULCER)   For further information, please review corresponding Wound Care flowsheet. No pressure ulcer noted and pressure ulcer prevention initiated.    Westley Brown RN

## 2018-02-15 NOTE — ED NOTES
Pt updated on plan of care-waiting on TDO and bed placement. [employer] at bedside. No si/s of acute distress. Suicide and safety precautions still in place. Report given to emmanuelle night shift rn, and care passed on of pt.

## 2018-02-15 NOTE — PROGRESS NOTES
Admission Medication Reconciliation:    Information obtained from:  Patient interview    Comments/Recommendations: Updated PTA meds/reviewed patient's allergies. 1)  Added:      - multivitamin powder daily    2) Patient is hesitant to take prescription medications       Significant PMH/Disease States:   History reviewed. No pertinent past medical history. Chief Complaint for this Admission:  No chief complaint on file. Allergies:  Review of patient's allergies indicates no known allergies. Prior to Admission Medications:   Prior to Admission Medications   Prescriptions Last Dose Informant Patient Reported? Taking? Ascorbic Acid-Multivits-Min 1,000 mg pwep   Yes Yes   Sig: Take 1 Packet by mouth daily.       Facility-Administered Medications: None     Obey Saldivar, PharmD, BCPP, [facility] Specialist, [facility]

## 2018-02-15 NOTE — BH NOTES
Pt's Mother called x2. Did not have pt code; therefore no acknowledgment of pt' being here given. Mother stated, \"I know he is there because I called and had him put there. .I just want someone to call the courts because Prasad Anamaria has a scheduled court appearance in [facility] this morning for being drunk in Public\".

## 2018-02-16 VITALS
RESPIRATION RATE: 16 BRPM | HEART RATE: 88 BPM | DIASTOLIC BLOOD PRESSURE: 87 MMHG | WEIGHT: 160 LBS | SYSTOLIC BLOOD PRESSURE: 137 MMHG | BODY MASS INDEX: 22.9 KG/M2 | TEMPERATURE: 98.2 F | OXYGEN SATURATION: 96 % | HEIGHT: 70 IN

## 2018-02-16 PROCEDURE — 74011250637 HC RX REV CODE- 250/637: Performed by: PSYCHIATRY & NEUROLOGY

## 2018-02-16 RX ORDER — FLUOXETINE 10 MG/1
10 CAPSULE ORAL DAILY
Qty: 15 CAP | Refills: 1 | Status: SHIPPED | OUTPATIENT
Start: 2018-02-17

## 2018-02-16 RX ADMIN — LORAZEPAM 1 MG: 1 TABLET ORAL at 08:43

## 2018-02-16 RX ADMIN — FLUOXETINE 10 MG: 10 CAPSULE ORAL at 08:43

## 2018-02-16 NOTE — BH NOTES
0600 - Refused am labs. Refused vital signs. Denied any s/sx of withdrawal. No s/sx of withdrawal observable.

## 2018-02-16 NOTE — PROGRESS NOTES
02/16/18 0748   CIWA/ETOH Withdrawal Scale   Nausea and Vomiting 0   Tactile Disturbances 0   Tremor 0   Auditory Disturbances 0   Paroxysmal Sweats 0   Visual Disturbances 0   Anxiety 1   Headache, Fullness in Head 0   Agitation 0   Orientation and Clouding of Sensorium 0   CIWA-Ar Total 1       Refused vs.     0837- Refused am Prozac. Offered prn Ativan to help with anxiety. Pt refused. Pt states, \"I just want to talk to the . \"    2137- Pt medicated with Ativan po for anxiety. Pt was tearful. Support given. He refused earlier, then stated \"Just go ahead and give them to me, I'll take the Ativan and depression medication. \"     1236- Pt's no longer crying. Appears less anxious. Resting in his room.

## 2018-02-16 NOTE — PROGRESS NOTES
Problem: Falls - Risk of  Goal: *Absence of Falls  Document Janell Fall Risk and appropriate interventions in the flowsheet. Outcome: Progressing Towards Goal  Fall Risk Interventions:    Pt in bed asleep. NAD. No falls noted/reported. Q 15 minute checks for safety maintained.        Medication Interventions: Teach patient to arise slowly

## 2018-02-16 NOTE — H&P
INITIAL PSYCHIATRIC EVALUATION            IDENTIFICATION:    Patient Name  Jonatan Byrd   Date of Birth 1990   [facility] [id number]   Medical Record Number  [id number]      Age  32 y.o. PCP None   Admit date:  2/14/2018    [facility]  734/[facility]   Date of Service  2/15/2018            HISTORY         REASON FOR HOSPITALIZATION:  CC: \"Depression\". Pt admitted under a temporary long term order (TDO) severe depression with suicidal ideations and an inability to care for self. HISTORY OF PRESENT ILLNESS:    The patient, Jonatan Byrd, is a 32 y.o. BLACK OR  male with a past psychiatric history significant for Behavioral issues in middle school, ADHD, past counseling, who presents at this time with complaints of (and/or evidence of) the following emotional symptoms: depression and suicidal thoughts/threats. Additional symptomatology include intermittent thoughts of suicide and hopelessness. The patient reports depression for several years exacerbated by the suicide of his best friend/ brother two years ago in February. He admits to binge drinking pattern. He was intoxicated yesterday and reported to his mother that he was suicidal.  He reported suicidal thoughts and plans to shoot himself or crash his car. (+)marijuana. Patient very tearful and hopeless during interview. Very reluctant to take medications, due to fear of side effects, fear of addictions. ALLERGIES: No Known Allergies   MEDICATIONS PRIOR TO ADMISSION:   Prescriptions Prior to Admission   Medication Sig    Ascorbic Acid-Multivits-Min 1,000 mg pwep Take 1 Packet by mouth daily. PAST MEDICAL HISTORY:   History reviewed. No pertinent past medical history. History reviewed. No pertinent surgical history. SOCIAL HISTORY: lives alone. Works as a .  No siblings   Social History     Social History    Marital status: SINGLE     Spouse name: N/A    Number of children: N/A    Years of education: N/A     Occupational History    Not on file. Social History Main Topics    Smoking status: Never Smoker    Smokeless tobacco: Never Used    Alcohol use Yes      Comment: \"beer,\" admitted to drinking earlier today, smells of ETOH    Drug use: No    Sexual activity: Not on file     Other Topics Concern    Not on file     Social History Narrative      FAMILY HISTORY: History reviewed. No pertinent family history. History reviewed. No pertinent family history. REVIEW OF SYSTEMS:   Gen: denies pain  Resp: denies sob  Cardiac: denies cp  GI: denies n/v/d  Extr: denies weakness  Psych: (+)depression  Pertinent items are noted in the History of Present Illness. All other Systems reviewed and are considered negative. MENTAL STATUS EXAM & VITALS     MENTAL STATUS EXAM (MSE):    MSE FINDINGS ARE WITHIN NORMAL LIMITS (WNL) UNLESS OTHERWISE STATED BELOW. ( ALL OF THE BELOW CATEGORIES OF THE MSE HAVE BEEN REVIEWED (reviewed 2/15/2018) AND UPDATED AS DEEMED APPROPRIATE )  General Presentation age appropriate, cooperative   Orientation oriented to time, place and person   Vital Signs  See below (reviewed 2/15/2018); Vital Signs (BP, Pulse, & Temp) are within normal limits if not listed below. Gait and Station Stable/steady, no ataxia   Musculoskeletal System No extrapyramidal symptoms (EPS); no abnormal muscular movements or Tardive Dyskinesia (TD); muscle strength and tone are within normal limits   Language No aphasia or dysarthria   Speech:  normal pitch and normal volume   Thought Processes logical; normal rate of thoughts; good abstract reasoning/computation   Thought Associations goal directed   Thought Content not internally preoccupied   Suicidal Ideations none   Homicidal Ideations none   Mood:  depressed   Affect:  depressed   Memory recent  good   Memory remote:  good   Concentration/Attention:  wnl   Fund of Knowledge avg.    Insight:  good   Reliability good   Judgment:  good VITALS:     Patient Vitals for the past 24 hrs:   Temp Pulse Resp BP SpO2   02/15/18 2139 98.2 °F (36.8 °C) 80 18 129/88 -   02/15/18 1730 98.5 °F (36.9 °C) 72 16 127/86 100 %   02/15/18 0857 98.7 °F (37.1 °C) 76 18 (!) 140/98 98 %   02/15/18 0654 98.7 °F (37.1 °C) 76 18 (!) 141/102 100 %   02/14/18 2300 98.6 °F (37 °C) 71 18 127/84 98 %     Wt Readings from Last 3 Encounters:   02/14/18 72.6 kg (160 lb)   02/14/18 72.6 kg (160 lb)     Temp Readings from Last 3 Encounters:   02/15/18 98.2 °F (36.8 °C)   02/14/18 98.9 °F (37.2 °C)     BP Readings from Last 3 Encounters:   02/15/18 129/88   02/14/18 109/78     Pulse Readings from Last 3 Encounters:   02/15/18 80   02/14/18 89            DATA     LABORATORY DATA:  Labs Reviewed - No data to display  Admission on 02/14/2018, Discharged on 02/14/2018   Component Date Value Ref Range Status    ALCOHOL(ETHYL),SERUM 02/14/2018 140* <10 MG/DL Final    WBC 02/14/2018 11.4* 4.1 - 11.1 K/uL Final    RBC 02/14/2018 4.71  4.10 - 5.70 M/uL Final    HGB 02/14/2018 15.2  12.1 - 17.0 g/dL Final    HCT 02/14/2018 43.5  36.6 - 50.3 % Final    MCV 02/14/2018 92.4  80.0 - 99.0 FL Final    MCH 02/14/2018 32.3  26.0 - 34.0 PG Final    MCHC 02/14/2018 34.9  30.0 - 36.5 g/dL Final    RDW 02/14/2018 11.9  11.5 - 14.5 % Final    PLATELET 50/68/8823 658  150 - 400 K/uL Final    MPV 02/14/2018 8.9  8.9 - 12.9 FL Final    NRBC 02/14/2018 0.0  0  WBC Final    ABSOLUTE NRBC 02/14/2018 0.00  0.00 - 0.01 K/uL Final    NEUTROPHILS 02/14/2018 65  32 - 75 % Final    LYMPHOCYTES 02/14/2018 27  12 - 49 % Final    MONOCYTES 02/14/2018 7  5 - 13 % Final    EOSINOPHILS 02/14/2018 1  0 - 7 % Final    BASOPHILS 02/14/2018 0  0 - 1 % Final    IMMATURE GRANULOCYTES 02/14/2018 0  0.0 - 0.5 % Final    ABS. NEUTROPHILS 02/14/2018 7.4  1.8 - 8.0 K/UL Final    ABS. LYMPHOCYTES 02/14/2018 3.1  0.8 - 3.5 K/UL Final    ABS. MONOCYTES 02/14/2018 0.8  0.0 - 1.0 K/UL Final    ABS. EOSINOPHILS 02/14/2018 0.1  0.0 - 0.4 K/UL Final    ABS. BASOPHILS 02/14/2018 0.0  0.0 - 0.1 K/UL Final    ABS. IMM. GRANS. 02/14/2018 0.0  0.00 - 0.04 K/UL Final    DF 02/14/2018 AUTOMATED    Final    Sodium 02/14/2018 145  136 - 145 mmol/L Final    Potassium 02/14/2018 3.7  3.5 - 5.1 mmol/L Final    Chloride 02/14/2018 106  97 - 108 mmol/L Final    CO2 02/14/2018 26  21 - 32 mmol/L Final    Anion gap 02/14/2018 13  5 - 15 mmol/L Final    Glucose 02/14/2018 77  65 - 100 mg/dL Final    BUN 02/14/2018 10  6 - 20 MG/DL Final    Creatinine 02/14/2018 1.10  0.70 - 1.30 MG/DL Final    BUN/Creatinine ratio 02/14/2018 9* 12 - 20   Final    GFR est AA 02/14/2018 >60  >60 ml/min/1.73m2 Final    GFR est non-AA 02/14/2018 >60  >60 ml/min/1.73m2 Final    Calcium 02/14/2018 9.1  8.5 - 10.1 MG/DL Final    Bilirubin, total 02/14/2018 0.6  0.2 - 1.0 MG/DL Final    ALT (SGPT) 02/14/2018 39  12 - 78 U/L Final    AST (SGOT) 02/14/2018 28  15 - 37 U/L Final    Alk.  phosphatase 02/14/2018 83  45 - 117 U/L Final    Protein, total 02/14/2018 7.9  6.4 - 8.2 g/dL Final    Albumin 02/14/2018 4.6  3.5 - 5.0 g/dL Final    Globulin 02/14/2018 3.3  2.0 - 4.0 g/dL Final    A-G Ratio 02/14/2018 1.4  1.1 - 2.2   Final    AMPHETAMINES 02/14/2018 NEGATIVE   NEG   Final    BARBITURATES 02/14/2018 NEGATIVE   NEG   Final    BENZODIAZEPINES 02/14/2018 NEGATIVE   NEG   Final    COCAINE 02/14/2018 NEGATIVE   NEG   Final    METHADONE 02/14/2018 NEGATIVE   NEG   Final    OPIATES 02/14/2018 NEGATIVE   NEG   Final    PCP(PHENCYCLIDINE) 02/14/2018 NEGATIVE   NEG   Final    THC (TH-CANNABINOL) 02/14/2018 POSITIVE* NEG   Final    Drug screen comment 02/14/2018 (NOTE)   Final    Color 02/14/2018 YELLOW/STRAW    Final    Appearance 02/14/2018 CLEAR  CLEAR   Final    Specific gravity 02/14/2018 1.025  1.003 - 1.030   Final    pH (UA) 02/14/2018 5.5  5.0 - 8.0   Final    Protein 02/14/2018 NEGATIVE   NEG mg/dL Final    Glucose 02/14/2018 NEGATIVE   NEG mg/dL Final    Ketone 02/14/2018 NEGATIVE   NEG mg/dL Final    Bilirubin 02/14/2018 NEGATIVE   NEG   Final    Blood 02/14/2018 NEGATIVE   NEG   Final    Urobilinogen 02/14/2018 0.2  0.2 - 1.0 EU/dL Final    Nitrites 02/14/2018 NEGATIVE   NEG   Final    Leukocyte Esterase 02/14/2018 NEGATIVE   NEG   Final    WBC 02/14/2018 0-4  0 - 4 /hpf Final    RBC 02/14/2018 0-5  0 - 5 /hpf Final    Epithelial cells 02/14/2018 FEW  FEW /lpf Final    Bacteria 02/14/2018 NEGATIVE   NEG /hpf Final    UA:UC IF INDICATED 02/14/2018 CULTURE NOT INDICATED BY UA RESULT  CNI   Final    Mucus 02/14/2018 1+* NEG /lpf Final        RADIOLOGY REPORTS:  No results found for this or any previous visit. No results found.            MEDICATIONS       ALL MEDICATIONS  Current Facility-Administered Medications   Medication Dose Route Frequency    FLUoxetine (PROzac) capsule 10 mg  10 mg Oral DAILY    ziprasidone (GEODON) 20 mg in sterile water (preservative free) 1 mL injection  20 mg IntraMUSCular BID PRN    OLANZapine (ZyPREXA) tablet 5 mg  5 mg Oral Q6H PRN    benztropine (COGENTIN) tablet 2 mg  2 mg Oral BID PRN    benztropine (COGENTIN) injection 2 mg  2 mg IntraMUSCular BID PRN    LORazepam (ATIVAN) injection 2 mg  2 mg IntraMUSCular Q4H PRN    LORazepam (ATIVAN) tablet 1 mg  1 mg Oral Q4H PRN    zolpidem (AMBIEN) tablet 10 mg  10 mg Oral QHS PRN    acetaminophen (TYLENOL) tablet 650 mg  650 mg Oral Q4H PRN    ibuprofen (MOTRIN) tablet 400 mg  400 mg Oral Q8H PRN    magnesium hydroxide (MILK OF MAGNESIA) 400 mg/5 mL oral suspension 30 mL  30 mL Oral DAILY PRN    nicotine (NICODERM CQ) 21 mg/24 hr patch 1 Patch  1 Patch TransDERmal DAILY PRN      SCHEDULED MEDICATIONS  Current Facility-Administered Medications   Medication Dose Route Frequency    FLUoxetine (PROzac) capsule 10 mg  10 mg Oral DAILY                ASSESSMENT & PLAN        The patient, Leslie Talley, is a 32 y.o.  male who presents at this time for treatment of the following diagnoses:  Patient Active Hospital Problem List:   Severe episode of recurrent major depressive disorder ([facility] [address] (2/15/2018)    Assessment:severe depression, untreated, poor insight    Plan: Agree with inpatient hospitalization for further stabilization, safety monitoring and medication management  Medications- Start prozac 10mg, monitor side effects  Provided supportive psychotherapy  Encourage outpatient treatment       Alcohol abuse (2/15/2018)    Assessment: moderate    Plan: fair insight, limited motivation  Low risk of withdrawal, monitor for withdrawal             A coordinated, multidisplinary treatment team (includes the nurse, unit pharmcist,  and writer) round was conducted for this initial evaluation with the patient present. The following regarding medications was addressed during rounds with patient:   the risks and benefits of the proposed medication. The patient was given the opportunity to ask questions. Informed consent given to the use of the above medications. I will continue to adjust psychiatric and non-psychiatric medications (see above \"medication\" section and orders section for details) as deemed appropriate & based upon diagnoses and response to treatment. I have reviewed admission (and previous/old) labs and medical tests in the EHR and or transferring hospital documents. I will continue to order blood tests/labs and diagnostic tests as deemed appropriate and review results as they become available (see orders for details). I have reviewed old psychiatric and medical records available in the EHR. I Will order additional psychiatric records from other institutions to further elucidate the nature of patient's psychopathology and review once available.     I will gather additional collateral information from friends, family and o/p treatment team to further elucidate the nature of patient's psychopathology and baselline level of psychiatric functioning.       ESTIMATED LENGTH OF STAY:    3-5 days       STRENGTHS:  Access to housing/residential stability and Interpersonal/supportive relationships (family, friends, peers)                                        SIGNED:    Ashlie Carlton MD  2/15/2018

## 2018-02-16 NOTE — INTERDISCIPLINARY ROUNDS
Behavioral Health Interdisciplinary Rounds     Patient Name: Drew Garcia  Age: 32 y.o. Room/Bed:  734/02  Primary Diagnosis: Severe episode of recurrent major depressive disorder (HCC)   Admission Status: TDO     Readmission within 30 days: yes  Power of  in place: no  Patient requires a blocked bed: no          Reason for blocked bed: n/a    VTE Prophylaxis: Not indicated  Flu vaccine given : no - Pt declined  Mobility needs/Fall risk: no    Nutritional Plan: no  Consults:          Labs/Testing due today?: no    Sleep hours: 7.5       Participation in Care/Groups:  Yes - Leisure group  Medication Compliant?: Yes  PRNS (last 24 hours): None    Restraints (last 24 hours):  no  Substance Abuse:  yes  CIWA (range last 24 hours): 0-3 COWS (range last 24 hours):   Alcohol screening (AUDIT) completed -  AUDIT Score: 14  If applicable, date SBIRT discussed in treatment team AND documented:   Tobacco - patient is a smoker: yes   Date tobacco education completed by RN: 2/14/18  24 hour chart check complete: yes     Patient goal(s) for today: Prepare for TDO hearing  Treatment team focus/goals: Review tx goals & TDO Pt to be d/c from the hearing and return home with follow up out pt tx   Progress note : Lack of insight into accepting nor managing his illness    LOS:  2  Expected LOS:     Financial concerns/prescription coverage:    Date of last family contact:  Spoke to his mother - blaming her for his hospitalization     Family requesting physician contact today:  SW spoke twice to Mrs Sterling regarding her son and advised of his d/c ( after TDO xochitl). Mother is concerned but feels he should contact others to provide transportation. Mother was directed to [facility] for family support  Discharge plan: Return to live with his uncle  Guns in the home:  No       Outpatient provider(s): [facility]    Participating treatment team members: Drew Garcia, Dr Rhoda Sanders.  Juli Zuniga RN and Francis MENENDEZ

## 2018-02-16 NOTE — PROGRESS NOTES
Problem: Depressed Mood (Adult/Pediatric)  Goal: *STG: Participates in treatment plan  Outcome: Progressing Towards Goal  Cooperative, withdrawn. Sad affect. Isolates to room or day room. Avoids staff and peers. Pt states visit with his mother \"didn't go well, she's upset\". Expresses anxiety and declines intervention. Denies SI. Verbalizes goal to shower and sleep, agrees to seek staff for needs. Problem: Falls - Risk of  Goal: *Absence of Falls  Document Janell Fall Risk and appropriate interventions in the flowsheet.    Outcome: Progressing Towards Goal  Fall Risk Interventions:            Medication Interventions: Teach patient to arise slowly

## 2018-02-16 NOTE — PROGRESS NOTES
Problem: Depressed Mood (Adult/Pediatric)  Goal: *STG: Participates in treatment plan  Variance: Patient Condition      Pt has TDO hearing today. Pt appears sad and depressed. Pt spoke to mom on the phone today. Pt slept well and is eating well today. Pt appears distracted and minimally interactive with peers. Pt observed looking down and the floor, avoiding eye contact. Pt refused am labs. Goal: *STG: Verbalizes anger, guilt, and other feelings in a constructive manor  Variance: Patient Condition    Pt appears guarded. Pt will not openly discuss his feelings. Goal: *STG: Attends activities and groups  Pt encouraged to attend groups  Goal: *STG: Remains safe in hospital  Outcome: Progressing Towards Goal  Remains on q 15 min safety checks. Goal: *STG: Complies with medication therapy  Variance: Patient Condition    Pt was ambivalent about taking meds this am, however did agree to take them. Problem: Suicide/Homicide (Adult/Pediatric)  Goal: *STG: Seeks staff when feelings of self harm or harm towards others arise  Pt encouraged to seek staff if he is struggling and feels like he is a harm to himself or others. Problem: Falls - Risk of  Goal: *Absence of Falls  Document Janell Fall Risk and appropriate interventions in the flowsheet.    Outcome: Progressing Towards Goal  Fall Risk Interventions:            Medication Interventions: Teach patient to arise slowly

## 2018-02-16 NOTE — BH NOTES
GROUP THERAPY PROGRESS NOTE    Adele Ferguson participated in the Acute Unit's afternoon Process Group, with a focus on identifying feelings, planning for the rest of the day, and preparing for discharge. Group time: 60 minutes. Personal goal for participation: To increase the capacity to improve ones mood and structure. Goal orientation: The patient will be able to identify their feelings, develop a plan for structuring their day, and discharge planning. Group therapy participation: With prompting, this patient participated in the group. Therapeutic interventions reviewed and discussed: The group members were asked to introduce themselves to each other and to see if they could identify an emotion they are having and/or let the group know what they want to focus on for the day as they continue to make discharge plans. Impression of participation: The patient said he was feeling \"better\" and then admitted that he was concerned about his up-coming TDO hearing this afternoon. He stated that he doesn't need to be here in the hospital even though he admitted crying on the telephone with his mother and telling her that he wanted to die. He denied any current SI or HI. He displayed no overt psychotic symptoms in this group. He also admitted that he tried to kill himself a couple of years ago and that he might have been hospitalized at that time. He said he was under a lot of stress as a  at a local [employer] and that some of his relationships Sharda Hews been rather tangled. \" He also admitted to poor sleep and some \"off and on self-medication,\" with a slight smile. He indicated that he might be willing to get help for his depression on an outpatient basis but that he didn't like the thought of being \"forced into it. \" His affect was more anxious than depressed this afternoon. His mood reflected his affect. This was the patient's first process group with the undersigned.

## 2018-02-16 NOTE — BH NOTES
LEISURE GROUP THERAPY PROGRESS NOTE    The patient Marlene mathew 32 y.o. male is participating in Leisure Group. Group time: 45 minutes    Personal goal for participation: Daily social interactions with other peers & staff.     Goal orientation: Relaxation activities    Group therapy participation: active    Therapeutic interventions reviewed and discussed:  Yes    Impression of participation: active participant    Dony Jimenes  2/15/2018  9:08 PM

## 2018-02-16 NOTE — DISCHARGE INSTRUCTIONS
DISCHARGE SUMMARY    Don Leon  : 1990  MRN: [id number]    The patient Lashay Wiseman exhibits the ability to control behavior in a less restrictive environment. Patient's level of functioning is improving. No assaultive/destructive behavior has been observed for the past 24 hours. No suicidal/homicidal threat or behavior has been observed for the past 24 hours. There is no evidence of serious medication side effects. Patient has not been in physical or protective restraints for at least the past 24 hours. If weapons involved, how are they secured? Yes, follow up with [facility] [address] [place] [address]    Is patient aware of and in agreement with discharge plan? Yes,  follow up with Daily [id number] [place] [address] for medication:  Prescriptions given to patient. Referral for substance abuse treatment ? No    Referral for smoking cessation needed ? No    Copy of discharge instructions to  provider?:  [phone]    Arrangements for transportation home:  Pt to arrange transportation    Keep all follow up appointments as scheduled, continue to take prescribed medications per physician instructions.   Mental health crisis number:  [phone] or your local mental health crisis line number at [phone]

## 2018-02-19 NOTE — BH NOTES
Behavioral Health Transition Record to Provider    Patient Name: Marlene Dawson  YOB: 1990  Medical Record Number: [id number]  Date of Admission: 2/14/2018  Date of Discharge: 2/16/2018    Attending Provider: Zenobia Garner MD  Discharging Provider: Zenobia Garner MD  To contact this individual call [phone] and ask the  to page. If unavailable, ask to be transferred to [facility] Provider on call. [facility] Provider will be available on call 24/7 and during holidays. Primary Care Provider: None    No Known Allergies    Reason for Admission: Patient was admitted under a temporary half-way order (TDO) for severe depression with suicidal ideations and an inability to care for self. Admission Diagnosis: deperssion  Depression    * No surgery found *    Results for orders placed or performed during the hospital encounter of 02/14/18   ETHYL ALCOHOL   Result Value Ref Range    ALCOHOL(ETHYL),SERUM 140 (H) <10 MG/DL   CBC WITH AUTOMATED DIFF   Result Value Ref Range    WBC 11.4 (H) 4.1 - 11.1 K/uL    RBC 4.71 4.10 - 5.70 M/uL    HGB 15.2 12.1 - 17.0 g/dL    HCT 43.5 36.6 - 50.3 %    MCV 92.4 80.0 - 99.0 FL    MCH 32.3 26.0 - 34.0 PG    MCHC 34.9 30.0 - 36.5 g/dL    RDW 11.9 11.5 - 14.5 %    PLATELET 356 920 - 372 K/uL    MPV 8.9 8.9 - 12.9 FL    NRBC 0.0 0  WBC    ABSOLUTE NRBC 0.00 0.00 - 0.01 K/uL    NEUTROPHILS 65 32 - 75 %    LYMPHOCYTES 27 12 - 49 %    MONOCYTES 7 5 - 13 %    EOSINOPHILS 1 0 - 7 %    BASOPHILS 0 0 - 1 %    IMMATURE GRANULOCYTES 0 0.0 - 0.5 %    ABS. NEUTROPHILS 7.4 1.8 - 8.0 K/UL    ABS. LYMPHOCYTES 3.1 0.8 - 3.5 K/UL    ABS. MONOCYTES 0.8 0.0 - 1.0 K/UL    ABS. EOSINOPHILS 0.1 0.0 - 0.4 K/UL    ABS. BASOPHILS 0.0 0.0 - 0.1 K/UL    ABS. IMM.  GRANS. 0.0 0.00 - 0.04 K/UL    DF AUTOMATED     METABOLIC PANEL, COMPREHENSIVE   Result Value Ref Range    Sodium 145 136 - 145 mmol/L    Potassium 3.7 3.5 - 5.1 mmol/L    Chloride 106 97 - 108 mmol/L CO2 26 21 - 32 mmol/L    Anion gap 13 5 - 15 mmol/L    Glucose 77 65 - 100 mg/dL    BUN 10 6 - 20 MG/DL    Creatinine 1.10 0.70 - 1.30 MG/DL    BUN/Creatinine ratio 9 (L) 12 - 20      GFR est AA >60 >60 ml/min/1.73m2    GFR est non-AA >60 >60 ml/min/1.73m2    Calcium 9.1 8.5 - 10.1 MG/DL    Bilirubin, total 0.6 0.2 - 1.0 MG/DL    ALT (SGPT) 39 12 - 78 U/L    AST (SGOT) 28 15 - 37 U/L    Alk. phosphatase 83 45 - 117 U/L    Protein, total 7.9 6.4 - 8.2 g/dL    Albumin 4.6 3.5 - 5.0 g/dL    Globulin 3.3 2.0 - 4.0 g/dL    A-G Ratio 1.4 1.1 - 2.2     DRUG SCREEN, URINE   Result Value Ref Range    AMPHETAMINES NEGATIVE  NEG      BARBITURATES NEGATIVE  NEG      BENZODIAZEPINES NEGATIVE  NEG      COCAINE NEGATIVE  NEG      METHADONE NEGATIVE  NEG      OPIATES NEGATIVE  NEG      PCP(PHENCYCLIDINE) NEGATIVE  NEG      THC (TH-CANNABINOL) POSITIVE (A) NEG      Drug screen comment (NOTE)    URINALYSIS W/ REFLEX CULTURE   Result Value Ref Range    Color YELLOW/STRAW      Appearance CLEAR CLEAR      Specific gravity 1.025 1.003 - 1.030      pH (UA) 5.5 5.0 - 8.0      Protein NEGATIVE  NEG mg/dL    Glucose NEGATIVE  NEG mg/dL    Ketone NEGATIVE  NEG mg/dL    Bilirubin NEGATIVE  NEG      Blood NEGATIVE  NEG      Urobilinogen 0.2 0.2 - 1.0 EU/dL    Nitrites NEGATIVE  NEG      Leukocyte Esterase NEGATIVE  NEG      WBC 0-4 0 - 4 /hpf    RBC 0-5 0 - 5 /hpf    Epithelial cells FEW FEW /lpf    Bacteria NEGATIVE  NEG /hpf    UA:UC IF INDICATED CULTURE NOT INDICATED BY UA RESULT CNI      Mucus 1+ (A) NEG /lpf       Immunizations administered during this encounter: There is no immunization history on file for this patient. Screening for Metabolic Disorders for Patients on Antipsychotic Medications  (Data obtained from the EMR)    Estimated Body Mass Index  Estimated body mass index is 22.96 kg/(m^2) as calculated from the following:    Height as of this encounter: 5' 10\" (1.778 m). Weight as of this encounter: 72.6 kg (160 lb). Vital Signs/Blood Pressure  Visit Vitals    /87    Pulse 88    Temp 98.2 °F (36.8 °C)    Resp 16    Ht 5' 10\" (1.778 m)    Wt 72.6 kg (160 lb)    SpO2 96%    BMI 22.96 kg/m2       Blood Glucose/Hemoglobin A1c  Lab Results   Component Value Date/Time    Glucose 77 2018 05:19 PM       No results found for: HBA1C, HGBE8, FDL1ZJJO     Lipid Panel  No results found for: CHOL, CHOLX, CHLST, CHOLV, 277216, HDL, LDL, LDLC, DLDLP, TGLX, TRIGL, TRIGP, CHHD, CHHDX     Discharge Diagnosis:  Severe episode of recurrent major depressive disorder (ICD-10-CM: F33.2); Alcohol abuse (ICD-10-CM: F10.10)    Discharge Plan: Patient discharged home. DISCHARGE SUMMARY    Elena Cary  : 1990  MRN: [id number]    The patient Mulugeta Hall exhibits the ability to control behavior in a less restrictive environment. Patient's level of functioning is improving. No assaultive/destructive behavior has been observed for the past 24 hours. No suicidal/homicidal threat or behavior has been observed for the past 24 hours. There is no evidence of serious medication side effects. Patient has not been in physical or protective restraints for at least the past 24 hours. If weapons involved, how are they secured? Yes, [facility] up with [facility] [address] [place] [address]    Is patient aware of and in agreement with discharge plan? Yes,  follow up with [facility] [id number] [place] [address] for medication:  Prescriptions given to patient. Referral for substance abuse treatment ? No    Referral for smoking cessation needed ? No    Copy of discharge instructions to  provider?:  [phone]    Arrangements for transportation home:  Pt to arrange transportation    Keep all follow up appointments as scheduled, continue to take prescribed medications per physician instructions.   Mental health crisis number:  [phone] or your local mental health crisis line number at [phone]    Discharge Medication List and Instructions:   Discharge Medication List as of 2/16/2018  6:03 PM      START taking these medications    Details   FLUoxetine (PROZAC) 10 mg capsule Take 1 Cap by mouth daily. Indications: Generalized Anxiety Disorder, major depressive disorder, Print, Disp-15 Cap, R-1         STOP taking these medications       Ascorbic Acid-Multivits-Min 1,000 mg pwep Comments:   Reason for Stopping:               Unresulted Labs     None        To obtain results of studies pending at discharge, please contact [phone]. Follow-up Information     Follow up With Details Comments Contact Info    None   None (395) Patient stated that they have no PCP      [facility] [address] [place] [address] On 2/20/2018 Time of Your Appt: 8:00 am [place] [id number]            Advanced Directive:   Does the patient have an appointed surrogate decision maker? No  Does the patient have a Medical Advance Directive? No  Does the patient have a Psychiatric Advance Directive? No  If the patient does not have a surrogate or Medical Advance Directive AND Psychiatric Advance Directive, the patient was offered information on these advance directives Yes and Patient declined to complete    Patient Instructions: Please continue all medications until otherwise directed by physician. Tobacco Cessation Discharge Plan:   Is the patient a smoker and needs referral for smoking cessation? No  Patient referred to the following for smoking cessation with an appointment? Not applicable     Patient was offered medication to assist with smoking cessation at discharge? Not applicable  Was education for smoking cessation added to the discharge instructions? Not applicable    Alcohol/Substance Abuse Discharge Plan:   Does the patient have a history of substance/alcohol abuse and requires a referral for treatment? Yes  Patient referred to the following for substance/alcohol abuse treatment with an appointment?  Yes, Patient has an appointment at [facility] on 2/20/18 at 8:00am.  Patient was offered medication to assist with alcohol cessation at discharge? Refused  Was education for substance/alcohol abuse added to discharge instructions? Yes    Patient discharged to Home; discussed with patient/caregiver and provided to the patient/caregiver either in hard copy or electronically.

## 2018-02-28 NOTE — DISCHARGE SUMMARY
PSYCHIATRIC DISCHARGE SUMMARY         IDENTIFICATION:    Patient Name  Michelle Espino   Date of Birth 1990   [facility] [id number]   Medical Record Number  [id number]      Age  32 y.o. PCP None   Admit date:  2/14/2018    Discharge date: 2/28/2018   Room Number  734/02  @ [facility]   Date of Service  2/28/2018            TYPE OF DISCHARGE: REGULAR               CONDITION AT DISCHARGE: improved       PROVISIONAL & DISCHARGE DIAGNOSES:    Problem List  Never Reviewed          Codes Class    * (Principal)Severe episode of recurrent major depressive disorder (HCC) ICD-10-CM: F33.2  ICD-9-CM: 296.33         Alcohol abuse ICD-10-CM: F10.10  ICD-9-CM: 305.00               Active Hospital Problems    *Severe episode of recurrent major depressive disorder ([facility] [address]      Alcohol abuse        DISCHARGE DIAGNOSIS:   Axis I:  SEE ABOVE  Axis II: SEE ABOVE  Axis III: SEE ABOVE  Axis IV:  Limited supports; lack of structure  Axis V:  45on admission, 55 on discharge 65(baseline)       CC & HISTORY OF PRESENT ILLNESS:   The patient, Michelle Espino, is a 32 y.o. BLACK OR  male with a past psychiatric history significant for Behavioral issues in middle school, ADHD, past counseling, who presents at this time with complaints of (and/or evidence of) the following emotional symptoms: depression and suicidal thoughts/threats. Additional symptomatology include intermittent thoughts of suicide and hopelessness. The patient reports depression for several years exacerbated by the suicide of his best friend/ brother two years ago in February. He admits to binge drinking pattern. He was intoxicated yesterday and reported to his mother that he was suicidal.  He reported suicidal thoughts and plans to shoot himself or crash his car. (+)marijuana. Patient very tearful and hopeless during interview. Very reluctant to take medications, due to fear of side effects, fear of addictions.      SOCIAL HISTORY:    Social History Social History    Marital status: SINGLE     Spouse name: N/A    Number of children: N/A    Years of education: N/A     Occupational History    Not on file. Social History Main Topics    Smoking status: Never Smoker    Smokeless tobacco: Never Used    Alcohol use Yes      Comment: \"beer,\" admitted to drinking earlier today, smells of ETOH    Drug use: No    Sexual activity: Not on file     Other Topics Concern    Not on file     Social History Narrative      FAMILY HISTORY:   History reviewed. No pertinent family history. HOSPITALIZATION COURSE:    Dl Chappell was admitted to the inpatient psychiatric unit [facility] for acute psychiatric stabilization in regards to symptomatology as described in the HPI above. The differential diagnosis at time of admission included: MDD. While on the unit Dl Chappell was involved in individual, group, occupational and milieu therapy. Psychiatric medications were adjusted during this hospitalization including Prozac. Dl Chappell demonstrated a slow, but progressive improvement in overall condition. Much of patient's depression appeared to be related to situational stressors, effects of drugs of abuse, and psychological factors. Please see individual progress notes for more specific details regarding patient's hospitalization course. At time of discharge, Dl Chappell is without significant problems of MDD. Patient free of suicidal and homicidal ideations (appears to be at very low risk of suicide or homicide) and reports many positive predictive factors in terms of not attempting suicide or homicide. Overall presentation at time of discharge is most consistent with the diagnosis of MDD. Patient with request for discharge today. There are no grounds to seek a TDO. Patient has maximized benefit to be derived from acute inpatient psychiatric treatment.   All members of the treatment team concur with each other in regards to plans for discharge today per patient's request.  Patient and family are aware and in agreement with discharge and discharge plan. LABS AND IMAGING:    Labs Reviewed - No data to display  No results found for: DS35, PHEN, PHENO, PHENT, DILF, DS39, PHENY, PTN, VALF2, VALAC, VALP, VALPR, DS6, CRBAM, CRBAMP, CARB2, XCRBAM  Admission on 02/14/2018, Discharged on 02/14/2018   Component Date Value Ref Range Status    ALCOHOL(ETHYL),SERUM 02/14/2018 140* <10 MG/DL Final    WBC 02/14/2018 11.4* 4.1 - 11.1 K/uL Final    RBC 02/14/2018 4.71  4.10 - 5.70 M/uL Final    HGB 02/14/2018 15.2  12.1 - 17.0 g/dL Final    HCT 02/14/2018 43.5  36.6 - 50.3 % Final    MCV 02/14/2018 92.4  80.0 - 99.0 FL Final    MCH 02/14/2018 32.3  26.0 - 34.0 PG Final    MCHC 02/14/2018 34.9  30.0 - 36.5 g/dL Final    RDW 02/14/2018 11.9  11.5 - 14.5 % Final    PLATELET 14/29/1434 502  150 - 400 K/uL Final    MPV 02/14/2018 8.9  8.9 - 12.9 FL Final    NRBC 02/14/2018 0.0  0  WBC Final    ABSOLUTE NRBC 02/14/2018 0.00  0.00 - 0.01 K/uL Final    NEUTROPHILS 02/14/2018 65  32 - 75 % Final    LYMPHOCYTES 02/14/2018 27  12 - 49 % Final    MONOCYTES 02/14/2018 7  5 - 13 % Final    EOSINOPHILS 02/14/2018 1  0 - 7 % Final    BASOPHILS 02/14/2018 0  0 - 1 % Final    IMMATURE GRANULOCYTES 02/14/2018 0  0.0 - 0.5 % Final    ABS. NEUTROPHILS 02/14/2018 7.4  1.8 - 8.0 K/UL Final    ABS. LYMPHOCYTES 02/14/2018 3.1  0.8 - 3.5 K/UL Final    ABS. MONOCYTES 02/14/2018 0.8  0.0 - 1.0 K/UL Final    ABS. EOSINOPHILS 02/14/2018 0.1  0.0 - 0.4 K/UL Final    ABS. BASOPHILS 02/14/2018 0.0  0.0 - 0.1 K/UL Final    ABS. IMM.  GRANS. 02/14/2018 0.0  0.00 - 0.04 K/UL Final    DF 02/14/2018 AUTOMATED    Final    Sodium 02/14/2018 145  136 - 145 mmol/L Final    Potassium 02/14/2018 3.7  3.5 - 5.1 mmol/L Final    Chloride 02/14/2018 106  97 - 108 mmol/L Final    CO2 02/14/2018 26  21 - 32 mmol/L Final    Anion gap 02/14/2018 13  5 - 15 mmol/L Final    Glucose 02/14/2018 77  65 - 100 mg/dL Final    BUN 02/14/2018 10  6 - 20 MG/DL Final    Creatinine 02/14/2018 1.10  0.70 - 1.30 MG/DL Final    BUN/Creatinine ratio 02/14/2018 9* 12 - 20   Final    GFR est AA 02/14/2018 >60  >60 ml/min/1.73m2 Final    GFR est non-AA 02/14/2018 >60  >60 ml/min/1.73m2 Final    Calcium 02/14/2018 9.1  8.5 - 10.1 MG/DL Final    Bilirubin, total 02/14/2018 0.6  0.2 - 1.0 MG/DL Final    ALT (SGPT) 02/14/2018 39  12 - 78 U/L Final    AST (SGOT) 02/14/2018 28  15 - 37 U/L Final    Alk.  phosphatase 02/14/2018 83  45 - 117 U/L Final    Protein, total 02/14/2018 7.9  6.4 - 8.2 g/dL Final    Albumin 02/14/2018 4.6  3.5 - 5.0 g/dL Final    Globulin 02/14/2018 3.3  2.0 - 4.0 g/dL Final    A-G Ratio 02/14/2018 1.4  1.1 - 2.2   Final    AMPHETAMINES 02/14/2018 NEGATIVE   NEG   Final    BARBITURATES 02/14/2018 NEGATIVE   NEG   Final    BENZODIAZEPINES 02/14/2018 NEGATIVE   NEG   Final    COCAINE 02/14/2018 NEGATIVE   NEG   Final    METHADONE 02/14/2018 NEGATIVE   NEG   Final    OPIATES 02/14/2018 NEGATIVE   NEG   Final    PCP(PHENCYCLIDINE) 02/14/2018 NEGATIVE   NEG   Final    THC (TH-CANNABINOL) 02/14/2018 POSITIVE* NEG   Final    Drug screen comment 02/14/2018 (NOTE)   Final    Color 02/14/2018 YELLOW/STRAW    Final    Appearance 02/14/2018 CLEAR  CLEAR   Final    Specific gravity 02/14/2018 1.025  1.003 - 1.030   Final    pH (UA) 02/14/2018 5.5  5.0 - 8.0   Final    Protein 02/14/2018 NEGATIVE   NEG mg/dL Final    Glucose 02/14/2018 NEGATIVE   NEG mg/dL Final    Ketone 02/14/2018 NEGATIVE   NEG mg/dL Final    Bilirubin 02/14/2018 NEGATIVE   NEG   Final    Blood 02/14/2018 NEGATIVE   NEG   Final    Urobilinogen 02/14/2018 0.2  0.2 - 1.0 EU/dL Final    Nitrites 02/14/2018 NEGATIVE   NEG   Final    Leukocyte Esterase 02/14/2018 NEGATIVE   NEG   Final    WBC 02/14/2018 0-4  0 - 4 /hpf Final    RBC 02/14/2018 0-5  0 - 5 /hpf Final    Epithelial cells 02/14/2018 FEW  FEW /lpf Final    Bacteria 02/14/2018 NEGATIVE   NEG /hpf Final    UA:UC IF INDICATED 02/14/2018 CULTURE NOT INDICATED BY UA RESULT  CNI   Final    Mucus 02/14/2018 1+* NEG /lpf Final     No results found. DISPOSITION:    Home. Patient to f/u with  psychiatric, and psychotherapy appointments. Patient is to f/u with internist as directed. FOLLOW-UP CARE:    Activity as tolerated  Regular Diet  Wound Care: none needed. Follow-up Information     Follow up With Details Comments Contact Info    None   None (395) Patient stated that they have no PCP      [facility] [address] [place] [address] On 2/20/2018 Time of Your Appt: 8:00 am [facility]  [address] [facility]                   PROGNOSIS:   Goodr---- based on nature of patient's pathology/ies and treatment compliance issues. Prognosis is greatly dependent upon patient's ability to remain sober and to follow up with drug/etoh rehabilitation and psychiatric/psychotherapy appointments as well as to comply with psychiatric medications as prescribed. DISCHARGE MEDICATIONS:     Informed consent given for the use of following psychotropic medications:  Discharge Medication List as of 2/16/2018  6:03 PM      START taking these medications    Details   FLUoxetine (PROZAC) 10 mg capsule Take 1 Cap by mouth daily. Indications: Generalized Anxiety Disorder, major depressive disorder, Print, Disp-15 Cap, R-1         STOP taking these medications       Ascorbic Acid-Multivits-Min 1,000 mg pwep Comments:   Reason for Stopping:                      A coordinated, multidisplinary treatment team round was conducted with Adin Gates is done daily here at [facility]. This team consists of the nurse, psychiatric unit pharmcist,  and writer. I have spent greater than 35 minutes on discharge work.     Signed:  Aleksandr Fortune MD  2/28/2018